# Patient Record
Sex: MALE | Race: WHITE | NOT HISPANIC OR LATINO | URBAN - METROPOLITAN AREA
[De-identification: names, ages, dates, MRNs, and addresses within clinical notes are randomized per-mention and may not be internally consistent; named-entity substitution may affect disease eponyms.]

---

## 2018-08-04 ENCOUNTER — OFFICE VISIT (OUTPATIENT)
Dept: URGENT CARE | Facility: CLINIC | Age: 28
End: 2018-08-04

## 2018-08-04 VITALS
BODY MASS INDEX: 23.7 KG/M2 | DIASTOLIC BLOOD PRESSURE: 83 MMHG | RESPIRATION RATE: 19 BRPM | TEMPERATURE: 97 F | SYSTOLIC BLOOD PRESSURE: 122 MMHG | HEIGHT: 69 IN | HEART RATE: 79 BPM | WEIGHT: 160 LBS | OXYGEN SATURATION: 100 %

## 2018-08-04 DIAGNOSIS — T67.5XXA HEAT EXHAUSTION, INITIAL ENCOUNTER: ICD-10-CM

## 2018-08-04 DIAGNOSIS — R11.0 NAUSEA: Primary | ICD-10-CM

## 2018-08-04 PROCEDURE — 99203 OFFICE O/P NEW LOW 30 MIN: CPT | Mod: S$GLB,,, | Performed by: NURSE PRACTITIONER

## 2018-08-04 RX ORDER — ONDANSETRON 4 MG/1
4 TABLET, ORALLY DISINTEGRATING ORAL EVERY 8 HOURS PRN
Qty: 30 TABLET | Refills: 0 | Status: SHIPPED | OUTPATIENT
Start: 2018-08-04 | End: 2018-08-14

## 2018-08-04 NOTE — PATIENT INSTRUCTIONS
Take rx zofran as needed every 8 hours for nausea.  Keep drinking water/gatorade while working in the heat. Eat small meals.    See handout on heat exhaustion.    Follow up with your doctor in a few days.  Return to the urgent care or go to the ER if symptoms get worse.      Heat Exhaustion  Heat exhaustion is a condition that develops during prolonged exposure to heat. It is more likely to occur during strenuous activity, such as exercise or manual labor. Symptoms include a fast heartbeat, excess sweating, extreme tiredness, muscle cramps, headache, and weakness. They may also include stomach cramps, nausea, and vomiting. The person may be lightheaded and dizzy, and may even faint.  Treatment for heat exhaustion involves cooling the body down and replacing lost fluids, electrolytes, and salts. Cooling may be done with fans, cold cloths, or a cold-water bath. Fluids are best replaced by drinking electrolyte solution, a sports drink, or water with 2 teaspoons of salt added for each 8 ounces. If a person is very dehydrated, confused, or unable to drink, IV (intravenous) fluids will likely be needed.  Heat exhaustion can progress to a serious condition called heatstroke, so it should be treated right away.  Home care  Continue to drink extra cold fluids at home during the next 12 to 24 hours. Water, electrolyte solution, or sports drinks are advised. Avoid alcohol and caffeine.  Preventing heat illness  · Protect yourself from the heat. Wear lightweight, light-colored clothing and a broad-brimmed hat.  · Drink plenty of fluids before and during activity.  · Limit exercise in hot or very humid weather. If you have to be active in the heat, take frequent breaks to drink fluids and cool down.  · Avoid exercising when you are feeling ill.  · Watch for symptoms of heat illness such as exhaustion, excess sweating, and lightheadedness. If any occur, move to a cool place, rest, and drink cool fluids. Lying down with your legs  raised slightly can help you recover.  · Avoid alcohol and caffeine.  Follow-up care  Follow up with your healthcare provider, or as advised.  When to seek medical advice  Call your healthcare provider right away for any of the following:  · Inability to keep fluids down  · Vomiting or diarrhea  · Hot flushed skin  · Worsening symptoms or new symptoms  Call 911  Call 911 or emergency services right away for any of these symptoms of heatstroke:  · Confusion  · Irrational behavior  · Hallucinations  · Trouble walking  · Seizures  · Passing out  · Fever of 104°F (40°C) or higher  Date Last Reviewed: 5/1/2017  © 3279-4548 [employer]. [address] [place] [address], [place] [address]. All rights reserved. This information is not intended as a substitute for professional medical care. Always follow your healthcare professional's instructions.

## 2018-08-04 NOTE — PROGRESS NOTES
"Subjective:       Patient ID: Kevin Muhammad is a 28 y.o. male.    Vitals:  height is 5' 9" (1.753 m) and weight is 72.6 kg (160 lb). His oral temperature is 97.1 °F (36.2 °C). His blood pressure is 122/83 and his pulse is 79. His respiration is 19 and oxygen saturation is 100%.     Chief Complaint: Nausea    Works in the heat and became overheated 2 days ago-ate a big meal. Started with nausea and headache. Symptoms have slowly improved but still having nausea and not feeling well. Denies vomiting/diarrhea. Denies current headache, denies muscle cramping, tingling. Denies fever/chills.      Nausea   This is a new problem. The current episode started yesterday. The problem occurs constantly. The problem has been unchanged. Associated symptoms include vomiting. Pertinent negatives include no abdominal pain, chest pain, chills, fever, headaches, nausea, rash or sore throat. Nothing aggravates the symptoms. He has tried drinking (Pepto-Bismal) for the symptoms. The treatment provided mild relief.     Review of Systems   Constitution: Negative for chills and fever.   HENT: Negative for sore throat.    Eyes: Negative for blurred vision.   Cardiovascular: Negative for chest pain.   Respiratory: Negative for shortness of breath.    Skin: Negative for rash.   Musculoskeletal: Negative for back pain and joint pain.   Gastrointestinal: Positive for vomiting. Negative for abdominal pain, diarrhea and nausea.   Neurological: Negative for headaches.   Psychiatric/Behavioral: The patient is not nervous/anxious.        Objective:      Physical Exam   Constitutional: He is oriented to person, place, and time. He appears well-developed and well-nourished.   HENT:   Head: Normocephalic and atraumatic.   Right Ear: External ear normal.   Left Ear: External ear normal.   Nose: Nose normal.   Mouth/Throat: Mucous membranes are normal.   Eyes: Conjunctivae and lids are normal.   Neck: Trachea normal and full passive range of motion " without pain. Neck supple.   Cardiovascular: Normal rate, regular rhythm and normal heart sounds.    Pulmonary/Chest: Effort normal and breath sounds normal. No respiratory distress.   Abdominal: Soft. Normal appearance and bowel sounds are normal. He exhibits no distension, no abdominal bruit, no pulsatile midline mass and no mass. There is no tenderness.   Musculoskeletal: Normal range of motion. He exhibits no edema.   Neurological: He is alert and oriented to person, place, and time. He has normal strength.   Skin: Skin is warm, dry and intact. He is not diaphoretic. No pallor.   Psychiatric: He has a normal mood and affect. His speech is normal and behavior is normal. Judgment and thought content normal. Cognition and memory are normal.   Nursing note and vitals reviewed.      Assessment:       1. Nausea    2. Heat exhaustion, initial encounter        Plan:         Nausea  -     ondansetron (ZOFRAN-ODT) 4 MG TbDL; Take 1 tablet (4 mg total) by mouth every 8 (eight) hours as needed.  Dispense: 30 tablet; Refill: 0    Heat exhaustion, initial encounter      Patient Instructions   Take rx zofran as needed every 8 hours for nausea.  Keep drinking water/gatorade while working in the heat. Eat small meals.    See handout on heat exhaustion.    Follow up with your doctor in a few days.  Return to the urgent care or go to the ER if symptoms get worse.      Heat Exhaustion  Heat exhaustion is a condition that develops during prolonged exposure to heat. It is more likely to occur during strenuous activity, such as exercise or manual labor. Symptoms include a fast heartbeat, excess sweating, extreme tiredness, muscle cramps, headache, and weakness. They may also include stomach cramps, nausea, and vomiting. The person may be lightheaded and dizzy, and may even faint.  Treatment for heat exhaustion involves cooling the body down and replacing lost fluids, electrolytes, and salts. Cooling may be done with fans, cold cloths,  or a cold-water bath. Fluids are best replaced by drinking electrolyte solution, a sports drink, or water with 2 teaspoons of salt added for each 8 ounces. If a person is very dehydrated, confused, or unable to drink, IV (intravenous) fluids will likely be needed.  Heat exhaustion can progress to a serious condition called heatstroke, so it should be treated right away.  Home care  Continue to drink extra cold fluids at home during the next 12 to 24 hours. Water, electrolyte solution, or sports drinks are advised. Avoid alcohol and caffeine.  Preventing heat illness  · Protect yourself from the heat. Wear lightweight, light-colored clothing and a broad-brimmed hat.  · Drink plenty of fluids before and during activity.  · Limit exercise in hot or very humid weather. If you have to be active in the heat, take frequent breaks to drink fluids and cool down.  · Avoid exercising when you are feeling ill.  · Watch for symptoms of heat illness such as exhaustion, excess sweating, and lightheadedness. If any occur, move to a cool place, rest, and drink cool fluids. Lying down with your legs raised slightly can help you recover.  · Avoid alcohol and caffeine.  Follow-up care  Follow up with your healthcare provider, or as advised.  When to seek medical advice  Call your healthcare provider right away for any of the following:  · Inability to keep fluids down  · Vomiting or diarrhea  · Hot flushed skin  · Worsening symptoms or new symptoms  Call 911  Call 911 or emergency services right away for any of these symptoms of heatstroke:  · Confusion  · Irrational behavior  · Hallucinations  · Trouble walking  · Seizures  · Passing out  · Fever of 104°F (40°C) or higher  Date Last Reviewed: 5/1/2017 © 2000-2017 [employer]. [address] [place] [address], [place] [address]. All rights reserved. This information is not intended as a substitute for professional medical care. Always follow your healthcare professional's  instructions.

## 2018-08-04 NOTE — LETTER
August 4, 2018      [facility] [place]  [address]  [place] [address]  Phone: [phone]  Fax: [phone]       Patient: Kevin Muhammad   YOB: 1990  Date of Visit: 08/04/2018    To Whom It May Concern:    Annie Muhammad  was at [facility] on 08/04/2018. He may return to work/school on 8/6/2018 with no restrictions. He was ill on 8/3 and 8/4/2018. If you have any questions or concerns, or if I can be of further assistance, please do not hesitate to contact me.    Sincerely,    Sola Gallegos NP

## 2018-09-10 ENCOUNTER — HOSPITAL ENCOUNTER (EMERGENCY)
Facility: HOSPITAL | Age: 28
Discharge: HOME | End: 2018-09-10
Attending: EMERGENCY MEDICINE

## 2018-09-10 VITALS
TEMPERATURE: 98 F | RESPIRATION RATE: 20 BRPM | DIASTOLIC BLOOD PRESSURE: 80 MMHG | SYSTOLIC BLOOD PRESSURE: 130 MMHG | OXYGEN SATURATION: 100 % | HEIGHT: 69 IN | WEIGHT: 160 LBS | HEART RATE: 70 BPM | BODY MASS INDEX: 23.7 KG/M2

## 2018-09-10 DIAGNOSIS — L30.9 ECZEMA, UNSPECIFIED TYPE: Primary | ICD-10-CM

## 2018-09-10 DIAGNOSIS — T07.XXXA MULTIPLE EXCORIATIONS: ICD-10-CM

## 2018-09-10 PROCEDURE — 99283 EMERGENCY DEPT VISIT LOW MDM: CPT

## 2018-09-10 RX ORDER — HYDROXYZINE HYDROCHLORIDE 25 MG/1
25 TABLET, FILM COATED ORAL EVERY 6 HOURS
Qty: 12 TABLET | Refills: 0 | Status: SHIPPED | OUTPATIENT
Start: 2018-09-10

## 2018-09-10 RX ORDER — HYDROCORTISONE 1 %
CREAM (GRAM) TOPICAL
Qty: 30 G | Refills: 0 | Status: SHIPPED | OUTPATIENT
Start: 2018-09-10

## 2018-09-10 NOTE — ED NOTES
Pt presents to  ER with c/o of rash to bilat foot/ankles with itching x 7day, pt stated no medication taken noted

## 2018-10-05 ENCOUNTER — HOSPITAL ENCOUNTER (EMERGENCY)
Facility: HOSPITAL | Age: 28
Discharge: HOME | End: 2018-10-05
Attending: EMERGENCY MEDICINE

## 2018-10-05 VITALS
TEMPERATURE: 98 F | SYSTOLIC BLOOD PRESSURE: 130 MMHG | RESPIRATION RATE: 20 BRPM | BODY MASS INDEX: 24.44 KG/M2 | DIASTOLIC BLOOD PRESSURE: 80 MMHG | WEIGHT: 165 LBS | HEIGHT: 69 IN | OXYGEN SATURATION: 100 % | HEART RATE: 70 BPM

## 2018-10-05 DIAGNOSIS — L30.9 DERMATITIS: Primary | ICD-10-CM

## 2018-10-05 DIAGNOSIS — B35.3 TINEA PEDIS OF BOTH FEET: ICD-10-CM

## 2018-10-05 PROCEDURE — 63600175 PHARM REV CODE 636 W HCPCS: Performed by: NURSE PRACTITIONER

## 2018-10-05 PROCEDURE — 96372 THER/PROPH/DIAG INJ SC/IM: CPT

## 2018-10-05 PROCEDURE — 99284 EMERGENCY DEPT VISIT MOD MDM: CPT | Mod: 25

## 2018-10-05 RX ORDER — DEXAMETHASONE SODIUM PHOSPHATE 4 MG/ML
8 INJECTION, SOLUTION INTRA-ARTICULAR; INTRALESIONAL; INTRAMUSCULAR; INTRAVENOUS; SOFT TISSUE
Status: COMPLETED | OUTPATIENT
Start: 2018-10-05 | End: 2018-10-05

## 2018-10-05 RX ORDER — HYDROCORTISONE 1 %
CREAM (GRAM) TOPICAL
Qty: 30 G | Refills: 0 | Status: SHIPPED | OUTPATIENT
Start: 2018-10-05 | End: 2018-10-15

## 2018-10-05 RX ADMIN — DEXAMETHASONE SODIUM PHOSPHATE 8 MG: 4 INJECTION, SOLUTION INTRAMUSCULAR; INTRAVENOUS at 01:10

## 2018-10-05 NOTE — DISCHARGE INSTRUCTIONS
Please take prescribed medication as labeled as needed.  You can also get over-the-counter nystatin powder or use OTC Lotrimin for foot fungus.  Change wet socks as soon as he comes home.  Soak feet in apple cider vinegar daily for a couple weeks.  Follow up with daughters of [facility] within 1-2 days and return to ED for any concerns.

## 2018-10-05 NOTE — ED PROVIDER NOTES
"Encounter Date: 10/5/2018       History     Chief Complaint   Patient presents with    Rash     28 year old male presents to ed cc of rash to bilateral feet x 1 week     Patient 28-year-old male who denies past medical history who presents to ED with a rash to bilateral feet/ankle x1 week.  Patient seen here in ED on 9/10/18 for same, diagnosed with eczema, and given prescriptions for Atarax and hydrocortisone cream.  Patient reports that he is a  and his boots get wet a lot and his "feet sweat."  Patient describes the rash as "itchy."  Patient reports that he put hydrocortisone cream on earlier today with some improvement.  Denies any alleviating or exacerbating factors.  Denies fever, chills, neck pain/stiffness, numbness, and tingling.  Denies any other complaints at this time.      The history is provided by the patient.     Review of patient's allergies indicates:  No Known Allergies  History reviewed. No pertinent past medical history.  Past Surgical History:   Procedure Laterality Date    KNEE ARTHROSCOPY Right      History reviewed. No pertinent family history.  Social History     Tobacco Use    Smoking status: Current Every Day Smoker    Smokeless tobacco: Current User   Substance Use Topics    Alcohol use: No     Frequency: Never    Drug use: No     Review of Systems   Constitutional: Negative for fever.   Skin: Positive for rash.   All other systems reviewed and are negative.      Physical Exam     Initial Vitals [10/05/18 1219]   BP Pulse Resp Temp SpO2   130/80 70 20 98.1 °F (36.7 °C) 100 %      MAP       --         Physical Exam    Vitals reviewed.  Constitutional: He appears well-developed and well-nourished. He is not diaphoretic. He is active and cooperative.  Non-toxic appearance. He does not have a sickly appearance.   HENT:   Head: Atraumatic.   Eyes: Pupils are equal, round, and reactive to light.   Neck: Trachea normal, normal range of motion, full passive range of " motion without pain and phonation normal. Neck supple.   Cardiovascular: Regular rhythm and normal pulses.   Pulses:       Dorsalis pedis pulses are 2+ on the right side, and 2+ on the left side.   Pulmonary/Chest: No respiratory distress.   Neurological: He is alert and oriented to person, place, and time. Gait normal.   Steady gait.    Skin: Skin is warm and dry.   Excoriated rash with mild erythema to distal dorsal aspect of bilateral legs.   No drainage or purulence.  No fluctuance.  Desquamation in inter-digits that is malodorous consistent with tinea pedis.    Psychiatric: He has a normal mood and affect.         ED Course   Procedures  Labs Reviewed - No data to display       Imaging Results    None          Medical Decision Making:   Initial Assessment:   Patient presents to ED with a rash to bilateral feet/ankles x1 week.  Appears well, nontoxic.  Afebrile.  Differential Diagnosis:   Tinea pedis, contact dermatitis  ED Management:  8 mg IM Decadron  Patient's signs and symptoms most consistent with dermatitis and tinea pedis.  Patient is stable and will be d/c home with prescription for hydrocortisone cream.  Patient also instructed to get OTC nystatin powder or Lotrimin for tinea pedis.  Instructed to change wet socks as soon as he comes home from work and to soak feet in apple cider vinegar daily for a couple weeks.  Instructed to follow up with daughters of [facility] within 1-2 days and return to ED for any concerns.                      Clinical Impression:   The primary encounter diagnosis was Dermatitis. A diagnosis of Tinea pedis of both feet was also pertinent to this visit.                             Abhay Carvalho NP  10/05/18 [id number]

## 2018-10-11 NOTE — ED PROVIDER NOTES
Encounter Date: 9/10/2018       History     Chief Complaint   Patient presents with    Rash     28 year old male presents to ed cc of rash to bilateral feet. patient states chronic intermittent rash that is on and off x 3 years. flare up 1 week ago         29 yo  here with rash, itchy on bilateral feet x 1 week. Frequently in work boots.   Itchy, waking him up at night. Denies f/c/n/v. Sx getting worse. Itching makes better.                 Review of patient's allergies indicates:  No Known Allergies  History reviewed. No pertinent past medical history.  Past Surgical History:   Procedure Laterality Date    KNEE ARTHROSCOPY Right      History reviewed. No pertinent family history.  Social History     Tobacco Use    Smoking status: Current Every Day Smoker    Smokeless tobacco: Current User   Substance Use Topics    Alcohol use: No     Frequency: Never    Drug use: No     Review of Systems   Constitutional: Negative.    HENT: Negative.    Respiratory: Negative.    Endocrine: Negative.    Skin: Positive for rash.   All other systems reviewed and are negative.      Physical Exam     Initial Vitals [09/10/18 0926]   BP Pulse Resp Temp SpO2   130/80 70 20 98 °F (36.7 °C) 100 %      MAP       --         Physical Exam    Nursing note and vitals reviewed.  Constitutional: He appears well-developed and well-nourished.   HENT:   Head: Normocephalic.   Eyes: Pupils are equal, round, and reactive to light.   Cardiovascular: Normal heart sounds.   Abdominal: Soft.   Neurological: He is alert and oriented to person, place, and time.   Skin: Skin is warm.   Excoriated dermatitis of bilateral lower ankles and feet   Psychiatric: He has a normal mood and affect.         ED Course   Procedures  Labs Reviewed - No data to display       Imaging Results    None          Medical Decision Making:   ED Management:  Tinea pedis and poor foot hygiene.  RX:  topical antifungal powder and changing socks      Return  precautions                      Clinical Impression:     The primary encounter diagnosis was Eczema, unspecified type. A diagnosis of Multiple excoriations was also pertinent to this visit.                             Daniela Cano MD  10/11/18 [id number]